# Patient Record
Sex: MALE | Race: WHITE | NOT HISPANIC OR LATINO | Employment: OTHER | URBAN - METROPOLITAN AREA
[De-identification: names, ages, dates, MRNs, and addresses within clinical notes are randomized per-mention and may not be internally consistent; named-entity substitution may affect disease eponyms.]

---

## 2022-06-07 NOTE — TELEPHONE ENCOUNTER
I stopped prescribing his pain medicine and clonazepam in July 2021 because he failed a urine drug test.  I was pretty sure that he would not come back after that.  Therefore he may have another doctor.  If so, he should get it from that doctor.  If not, we can send in 1 month with 1 refill and he will need an appointment in July for a physical and blood work

## 2022-06-09 RX ORDER — SILDENAFIL CITRATE 20 MG/1
TABLET ORAL
Qty: 24 TABLET | Refills: 0 | OUTPATIENT
Start: 2022-06-09

## 2022-07-14 NOTE — TELEPHONE ENCOUNTER
Called patient to get him scheduled for an appointment. Dr. Bedoya wants him scheduled for a physical and bloodwork. Had to [facility], if he returns call, please ask him if he has been actively taking all his medications, if so, we will send in enough to get him to his appointment.         Documentation

## 2022-07-14 NOTE — TELEPHONE ENCOUNTER
Called patient to get him scheduled for an appointment. Dr. Bedoya wants him scheduled for a physical and bloodwork. Had to [facility], if he returns call, please ask him if he has been actively taking all his medications, if so, we will send in enough to get him to his appointment.

## 2022-07-15 RX ORDER — ATORVASTATIN CALCIUM 20 MG/1
TABLET, FILM COATED ORAL
Qty: 90 TABLET | OUTPATIENT
Start: 2022-07-15

## 2022-07-15 RX ORDER — LISINOPRIL 40 MG/1
TABLET ORAL
Qty: 90 TABLET | OUTPATIENT
Start: 2022-07-15

## 2022-07-15 RX ORDER — NABUMETONE 750 MG/1
TABLET, FILM COATED ORAL
Qty: 180 TABLET | OUTPATIENT
Start: 2022-07-15

## 2025-04-02 ENCOUNTER — OFFICE VISIT (OUTPATIENT)
Dept: NEUROSURGERY | Facility: CLINIC | Age: 70
End: 2025-04-02
Payer: MEDICARE

## 2025-04-02 VITALS — WEIGHT: 237 LBS | TEMPERATURE: 98 F | BODY MASS INDEX: 32.1 KG/M2 | HEIGHT: 72 IN

## 2025-04-02 DIAGNOSIS — M50.30 DDD (DEGENERATIVE DISC DISEASE), CERVICAL: ICD-10-CM

## 2025-04-02 DIAGNOSIS — M47.812 CERVICAL SPONDYLOSIS WITHOUT MYELOPATHY: ICD-10-CM

## 2025-04-02 DIAGNOSIS — M54.2 NECK PAIN: Primary | ICD-10-CM

## 2025-04-02 RX ORDER — AMLODIPINE BESYLATE 5 MG/1
1 TABLET ORAL DAILY
COMMUNITY

## 2025-04-02 RX ORDER — HYDROCODONE BITARTRATE AND ACETAMINOPHEN 7.5; 325 MG/1; MG/1
1 TABLET ORAL 3 TIMES DAILY
COMMUNITY

## 2025-04-02 RX ORDER — LIDOCAINE 50 MG/G
PATCH TOPICAL
COMMUNITY

## 2025-04-02 RX ORDER — CELECOXIB 100 MG/1
1 CAPSULE ORAL 2 TIMES DAILY
COMMUNITY

## 2025-04-02 RX ORDER — ATORVASTATIN CALCIUM 20 MG/1
1 TABLET, FILM COATED ORAL DAILY
COMMUNITY

## 2025-04-02 RX ORDER — LISINOPRIL 40 MG/1
1 TABLET ORAL DAILY
COMMUNITY

## 2025-04-02 RX ORDER — TAMSULOSIN HYDROCHLORIDE 0.4 MG/1
1 CAPSULE ORAL DAILY
COMMUNITY

## 2025-04-02 RX ORDER — CLONAZEPAM 1 MG/1
0.5 TABLET ORAL DAILY
COMMUNITY

## 2025-04-02 RX ORDER — SILDENAFIL CITRATE 20 MG/1
20 TABLET ORAL
COMMUNITY

## 2025-04-02 RX ORDER — TESTOSTERONE CYPIONATE 200 MG/ML
INJECTION, SOLUTION INTRAMUSCULAR
COMMUNITY
Start: 2025-02-10

## 2025-04-02 NOTE — PROGRESS NOTES
Patient: Alex Car  : 1955    Primary Care Provider: Filemon Chatman MD    Requesting Provider: As above          History of Present Illness  The patient is a 69-year-old gentleman who presents for evaluation of neck pain.    Mr. Car has been experiencing persistent neck pain for the past 1.5 years, which he initially attributed to a crick in the neck upon awakening one morning. The pain is bilateral but predominantly affects the right side and is characterized by a constant burning sensation. He also reports associated pain in his right shoulder, although it does not radiate down the arm. Occasional numbness in his right hand is also noted. His range of motion is significantly limited, particularly when turning from side to side or looking up or down, which exacerbates the burning sensation. Despite these interventions, he continues to experience discomfort. He has not engaged in any physical therapy. 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 He sought treatment from a specialist last year, who performed nerve ablations on both sides without any benefit. An MRI revealed two bulging discs in his neck, for which he received an epidural injection that provided some relief. He has been managing the inflammation with medication.  He also has a history of low back pain.  Number of years ago he had an epidural injection performed.  In recent days he has had some pain extending into the back of the left thigh and has had some limping.    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      Review of Systems   Constitutional:  Negative for activity change, appetite change, chills, diaphoresis, fatigue, fever and unexpected weight change.   HENT:  Negative for congestion, dental problem, drooling, ear discharge, ear pain, facial swelling, hearing loss, mouth sores, nosebleeds, postnasal drip, rhinorrhea, sinus pressure,  sinus pain, sneezing, sore throat, tinnitus, trouble swallowing and voice change.    Eyes:  Negative for photophobia, pain, discharge, redness, itching and visual disturbance.   Respiratory:  Negative for apnea, cough, choking, chest tightness, shortness of breath, wheezing and stridor.    Cardiovascular:  Negative for chest pain, palpitations and leg swelling.   Gastrointestinal:  Negative for abdominal distention, abdominal pain, anal bleeding, blood in stool, constipation, diarrhea, nausea, rectal pain and vomiting.   Endocrine: Negative for cold intolerance, heat intolerance, polydipsia, polyphagia and polyuria.   Genitourinary:  Negative for decreased urine volume, difficulty urinating, dysuria, enuresis, flank pain, frequency, genital sores, hematuria, penile discharge, penile pain, penile swelling, scrotal swelling, testicular pain and urgency.   Musculoskeletal:  Positive for neck pain. Negative for arthralgias, back pain, gait problem, joint swelling, myalgias and neck stiffness.   Skin:  Negative for color change, pallor, rash and wound.   Allergic/Immunologic: Negative for environmental allergies, food allergies and immunocompromised state.   Neurological:  Negative for dizziness, tremors, seizures, syncope, facial asymmetry, speech difficulty, weakness, light-headedness, numbness and headaches.   Hematological:  Negative for adenopathy. Does not bruise/bleed easily.   Psychiatric/Behavioral:  Negative for agitation, behavioral problems, confusion, decreased concentration, dysphoric mood, hallucinations, self-injury, sleep disturbance and suicidal ideas. The patient is not nervous/anxious and is not hyperactive.      The patient's past medical history, past surgical history, family history, and social history have been reviewed at length in the electronic medical record.      Physical Exam:   CONSTITUTIONAL: Patient is well-nourished, pleasant and appears stated age.  MUSCULOSKELETAL:  Neck tenderness to  palpation is not observed.   ROM in the neck is limited in all directions.  Right leg raising is negative.  Mal signs are negative.  NEUROLOGICAL:  Orientation, memory, attention span, language function, and cognition have been examined and are intact.  Strength is intact in the upper and lower extremities to direct testing.  Muscle tone is normal throughout.  Station and gait are normal.  Sensation is intact to light touch testing throughout.  Deep tendon reflexes are 1+ and symmetrical.  Yaneli's Sign is negative bilaterally. No clonus is elicited.  Coordination is intact.      Medical Decision Making    Data Review:   (All imaging studies were personally reviewed unless stated otherwise)  Results  Imaging  MRI of the cervical spine dated 1/30/2025 demonstrates some diffuse spondylosis.  There are some disc-osteophyte more to the right at C3-4 and more to the left at C4-5.  There is broad-based spurring at C5-6.  There is no cord compromise.      Diagnosis:   1.  Mechanical neck pain.  2.  Cervical spondylosis.  3.  Chronic mechanical low back pain.    Treatment Options:   Presently there is no role for surgical intervention.  I would continue treatment with his pain management physician.    Patient or patient representative verbalized consent for the use of Ambient Listening during the visit with  El Reina MD for chart documentation. 4/2/2025  11:19 EDT    Scribed for El Reina MD by Samantha Fitch CMA on 4/2/2025 11:19 EDT       I, Dr. Reina, personally performed the services described in the documentation, as scribed in my presence, and it is both accurate and complete.